# Patient Record
Sex: FEMALE | Race: OTHER | NOT HISPANIC OR LATINO | Employment: STUDENT | URBAN - METROPOLITAN AREA
[De-identification: names, ages, dates, MRNs, and addresses within clinical notes are randomized per-mention and may not be internally consistent; named-entity substitution may affect disease eponyms.]

---

## 2023-09-27 ENCOUNTER — OFFICE VISIT (OUTPATIENT)
Dept: URGENT CARE | Facility: CLINIC | Age: 18
End: 2023-09-27
Payer: COMMERCIAL

## 2023-09-27 VITALS
OXYGEN SATURATION: 98 % | WEIGHT: 78.13 LBS | RESPIRATION RATE: 19 BRPM | SYSTOLIC BLOOD PRESSURE: 112 MMHG | BODY MASS INDEX: 13.84 KG/M2 | HEIGHT: 63 IN | HEART RATE: 80 BPM | DIASTOLIC BLOOD PRESSURE: 84 MMHG | TEMPERATURE: 99 F

## 2023-09-27 DIAGNOSIS — N92.6 IRREGULAR MENSTRUAL CYCLE: Primary | ICD-10-CM

## 2023-09-27 LAB
B-HCG UR QL: NEGATIVE
CTP QC/QA: YES

## 2023-09-27 PROCEDURE — 81025 URINE PREGNANCY TEST: CPT | Mod: S$GLB,,, | Performed by: INTERNAL MEDICINE

## 2023-09-27 PROCEDURE — 99213 PR OFFICE/OUTPT VISIT, EST, LEVL III, 20-29 MIN: ICD-10-PCS | Mod: S$GLB,,, | Performed by: INTERNAL MEDICINE

## 2023-09-27 PROCEDURE — 99213 OFFICE O/P EST LOW 20 MIN: CPT | Mod: S$GLB,,, | Performed by: INTERNAL MEDICINE

## 2023-09-27 PROCEDURE — 81025 POCT URINE PREGNANCY: ICD-10-PCS | Mod: S$GLB,,, | Performed by: INTERNAL MEDICINE

## 2023-09-27 NOTE — PATIENT INSTRUCTIONS
Follow up with GYN if you have not had a cycle in the next week. Pregnancy test negative in clinic.

## 2023-09-27 NOTE — PROGRESS NOTES
"Subjective:      Patient ID: Shiela Smith is a 18 y.o. female.    Vitals:  height is 5' 3" (1.6 m) and weight is 35.5 kg (78 lb 2.5 oz).     Chief Complaint: Menstrual Problem    Patient presents a irregular menstrual.Plan B was taking around 8/2/2023, no birth control.    17 yo patient presents for irregular menstrual cycle. She had unprotected sex on august 1st or second and took a plan B on august 6th. She had a cycle on august 11th and August 19th. She has not had any sexual encounters since then. She has no other symptoms today. She is now one week late for her September cycle.       Genitourinary:  Positive for irregular menstruation.   Skin:  Negative for erythema.      Objective:     Physical Exam   Constitutional: She is oriented to person, place, and time. She appears well-developed. No distress.   HENT:   Head: Normocephalic and atraumatic.   Ears:   Right Ear: External ear normal.   Left Ear: External ear normal.   Nose: Nose normal.   Mouth/Throat: Oropharynx is clear and moist. No oropharyngeal exudate.   Eyes: Conjunctivae and EOM are normal. Pupils are equal, round, and reactive to light. Right eye exhibits no discharge. Left eye exhibits no discharge. No scleral icterus.   Neck: Neck supple.   Cardiovascular: Normal rate, regular rhythm and normal heart sounds.   No murmur heard.Exam reveals no gallop and no friction rub.   Pulmonary/Chest: Effort normal. No respiratory distress. She has no wheezes. She has no rales.   Abdominal: There is no abdominal tenderness.   Lymphadenopathy:     She has no cervical adenopathy.   Neurological: She is alert and oriented to person, place, and time.   Skin: Skin is warm, dry, not diaphoretic and no rash. Capillary refill takes less than 2 seconds. No erythema   Psychiatric: Her behavior is normal.   Nursing note and vitals reviewed.      Assessment:     1. Irregular menstrual cycle        Plan:       Irregular menstrual cycle  -     POCT urine pregnancy  -     " Ambulatory referral/consult to Gynecology    Likely delayed 2/2 to plan B. Two cycles since unprotected sexual encounter. Pregnancy test negative in clinic. Referral to gyn if she has not had cycle in next week.

## 2023-10-31 ENCOUNTER — OFFICE VISIT (OUTPATIENT)
Dept: URGENT CARE | Facility: CLINIC | Age: 18
End: 2023-10-31
Payer: COMMERCIAL

## 2023-10-31 VITALS
RESPIRATION RATE: 18 BRPM | DIASTOLIC BLOOD PRESSURE: 74 MMHG | BODY MASS INDEX: 17.36 KG/M2 | WEIGHT: 98 LBS | OXYGEN SATURATION: 99 % | TEMPERATURE: 98 F | HEART RATE: 78 BPM | HEIGHT: 63 IN | SYSTOLIC BLOOD PRESSURE: 104 MMHG

## 2023-10-31 DIAGNOSIS — R05.9 COUGH, UNSPECIFIED TYPE: Primary | ICD-10-CM

## 2023-10-31 PROCEDURE — 99213 PR OFFICE/OUTPT VISIT, EST, LEVL III, 20-29 MIN: ICD-10-PCS | Mod: S$GLB,,, | Performed by: NURSE PRACTITIONER

## 2023-10-31 PROCEDURE — 99213 OFFICE O/P EST LOW 20 MIN: CPT | Mod: S$GLB,,, | Performed by: NURSE PRACTITIONER

## 2023-10-31 NOTE — PATIENT INSTRUCTIONS
Try cooler showers.  Get in and get out.  No long shower for relaxation.  If your cough continues after taking cooler showers let us know.  Can take Delsym cough syrup during the day to help with cough.  Can continue to take Nyquil at night to help you sleep.

## 2023-10-31 NOTE — PROGRESS NOTES
"Subjective:      Patient ID: Shiela Smith is a 18 y.o. female.    Vitals:  height is 5' 3" (1.6 m) and weight is 44.5 kg (98 lb). Her oral temperature is 98.3 °F (36.8 °C). Her blood pressure is 104/74 and her pulse is 78. Her respiration is 18 and oxygen saturation is 99%.     Chief Complaint: Cough    Patient presents a cough after getting out of the shower that's been on going for 2 months.    Cough  This is a new problem. The current episode started more than 1 month ago. The problem has been unchanged. The problem occurs every few minutes. She has tried OTC cough suppressant (Nyquil) for the symptoms. The treatment provided no relief.     Respiratory:  Positive for cough.     Pt states she only coughs and feels bad with steam showers and the cough last for 3 days  Objective:     Physical Exam   Constitutional: She is oriented to person, place, and time.  Non-toxic appearance. She does not appear ill. No distress.   HENT:   Head: Normocephalic and atraumatic.   Ears:   Right Ear: Tympanic membrane normal.   Left Ear: Tympanic membrane normal.   Nose: No congestion.   Mouth/Throat: Mucous membranes are moist. Oropharynx is clear.   Eyes: Conjunctivae are normal. Pupils are equal, round, and reactive to light. Extraocular movement intact   Cardiovascular: Normal rate, regular rhythm, normal heart sounds and normal pulses.   Pulmonary/Chest: Effort normal and breath sounds normal. No respiratory distress. She has no wheezes.   Slight dry cough noted occasionally         Comments: Slight dry cough noted occasionally    Abdominal: Normal appearance. There is no abdominal tenderness.   Musculoskeletal: Normal range of motion.         General: Normal range of motion.      Right lower leg: No edema.      Left lower leg: No edema.   Neurological: no focal deficit. She is alert and oriented to person, place, and time.   Skin: Skin is warm and not diaphoretic.   Psychiatric: Her behavior is normal. Mood normal.   Nursing " note and vitals reviewed.    Assessment:     1. Cough, unspecified type       Plan:       Patient Instructions   Try cooler showers.  Get in and get out.  No long shower for relaxation.  If your cough continues after taking cooler showers let us know.  Can take Delsym cough syrup during the day to help with cough.  Can continue to take Nyquil at night to help you sleep.

## 2024-04-11 ENCOUNTER — OFFICE VISIT (OUTPATIENT)
Dept: URGENT CARE | Facility: CLINIC | Age: 19
End: 2024-04-11
Payer: COMMERCIAL

## 2024-04-11 VITALS
SYSTOLIC BLOOD PRESSURE: 101 MMHG | WEIGHT: 98.13 LBS | RESPIRATION RATE: 19 BRPM | BODY MASS INDEX: 17.39 KG/M2 | TEMPERATURE: 98 F | OXYGEN SATURATION: 99 % | HEART RATE: 97 BPM | DIASTOLIC BLOOD PRESSURE: 73 MMHG | HEIGHT: 63 IN

## 2024-04-11 DIAGNOSIS — R51.9 ACUTE NONINTRACTABLE HEADACHE, UNSPECIFIED HEADACHE TYPE: Primary | ICD-10-CM

## 2024-04-11 PROCEDURE — 99213 OFFICE O/P EST LOW 20 MIN: CPT | Mod: S$GLB,,, | Performed by: NURSE PRACTITIONER

## 2024-04-11 NOTE — PROGRESS NOTES
"Subjective:      Patient ID: Shiela Smith is a 18 y.o. female.    Vitals:  height is 5' 3" (1.6 m) and weight is 44.5 kg (98 lb 1.7 oz). Her oral temperature is 98.4 °F (36.9 °C). Her blood pressure is 101/73 and her pulse is 97. Her respiration is 19 and oxygen saturation is 99%.     Chief Complaint: Headache    Pt presents an constant headache that started yesterday. Pt states she has been taking tylenol for pain.    19 yo woman, presents to clinic for a headache since yesterday.  She took some Tylenol yesterday, felt a little better and fell asleep.  Reports her headache came back when she tried to go to class.  Describes a headache that is on the back of the head and around both temples.  She woke up a bit sweaty today and with a subjective mild fever. Denies any neck stiffness, n/v/d, recent h/o URI, head trauma, photophobia, LOC, sinus pressure, somnolence, speech problems, weakness in face, arms/legs, loss of sensation/numbness or tingling in face, arms or legs, denies the worst headache of her life. LMP: 3/14/24.  She is not on birth control.  Denies recent sexual activity. She is not prone to having headaches.  Has never seen a PCP or neurology for headache management or diagnosis. Requesting a school note for today due to this headache.     Headache   This is a new problem. The current episode started yesterday. The problem occurs every few minutes. The quality of the pain is described as throbbing. The pain is at a severity of 4/10. The pain is mild. She has tried acetaminophen for the symptoms. The treatment provided mild relief.       Neurological:  Positive for headaches.      Objective:     Physical Exam   Constitutional: She is oriented to person, place, and time. She appears well-developed.  Non-toxic appearance. She does not appear ill. No distress.   HENT:   Head: Normocephalic and atraumatic.   Ears:   Right Ear: Hearing, tympanic membrane, external ear and ear canal normal.   Left Ear: Hearing, " tympanic membrane, external ear and ear canal normal.   Nose: Nose normal. No mucosal edema, rhinorrhea or nasal deformity. No epistaxis. Right sinus exhibits no maxillary sinus tenderness and no frontal sinus tenderness. Left sinus exhibits no maxillary sinus tenderness and no frontal sinus tenderness.   Mouth/Throat: Uvula is midline, oropharynx is clear and moist and mucous membranes are normal. Mucous membranes are moist. No trismus in the jaw. Normal dentition. No uvula swelling. No posterior oropharyngeal erythema.      Comments: No adenopathy  Eyes: Conjunctivae, EOM and lids are normal. Pupils are equal, round, and reactive to light. No scleral icterus. Extraocular movement intact   Neck: Trachea normal and phonation normal. Neck supple. No neck rigidity present.   Cardiovascular: Normal rate, regular rhythm, normal heart sounds and normal pulses.   Pulmonary/Chest: Effort normal and breath sounds normal. No stridor. No respiratory distress. She has no wheezes. She has no rhonchi. She has no rales. She exhibits no tenderness.   Abdominal: Normal appearance and bowel sounds are normal. She exhibits no distension. Soft. There is no abdominal tenderness.   Musculoskeletal: Normal range of motion.         General: No deformity. Normal range of motion.   Neurological: no focal deficit. She is alert and oriented to person, place, and time. She displays no weakness and normal reflexes. No cranial nerve deficit or sensory deficit. She exhibits normal muscle tone. Coordination and gait normal.   Skin: Skin is warm, dry, intact, not diaphoretic and not pale.   Psychiatric: Her speech is normal and behavior is normal. Judgment and thought content normal.   Nursing note and vitals reviewed.      Assessment:     1. Acute nonintractable headache, unspecified headache type        Plan:   Patient with a new onset headache. Declined a Toradol injection today.  She will try conservative management with OTC medications.  This  may be a menstrual headache, which will resolve when menses begin.  Advised to keep a calendar of headaches.  Follow up with PCP for continued headaches. It's also possible, patient is getting sick, advised to monitor symptoms.  If patient continues to have headaches, she will need to be established with the [employer] Access[employer] for school accommodations in the future.  Return to clinic as needed.     Acute nonintractable headache, unspecified headache type

## 2024-04-11 NOTE — LETTER
April 11, 2024      Urgent Care - [facility]  [address] [address]  Phone: [phone]  Fax: [phone]       Patient: Shiela Smith   YOB: 2005  Date of Visit: 04/11/2024    To Whom It May Concern:    Viki Smith  was at [facility] on 04/11/2024. The patient may return to work/school on 04/12/24 with no restrictions. If you have any questions or concerns, or if I can be of further assistance, please do not hesitate to contact me.    Sincerely,    Linda Pina NP

## 2024-04-11 NOTE — PATIENT INSTRUCTIONS
Take Advil 600 mg and Tylenol 1 g one time together with food for your headache.  You can repeat the same regimen every 8 hours.  Hydrate.  Rest.  Follow up with the Office of accessible Education for accommodations and your Primary health provider if you headaches continue.  Go to ER for the worst headache of your life or if it gets worse in intensity or duration.

## 2024-11-15 ENCOUNTER — OFFICE VISIT (OUTPATIENT)
Dept: URGENT CARE | Facility: CLINIC | Age: 19
End: 2024-11-15
Payer: COMMERCIAL

## 2024-11-15 VITALS
WEIGHT: 97.69 LBS | TEMPERATURE: 99 F | HEIGHT: 63 IN | RESPIRATION RATE: 18 BRPM | SYSTOLIC BLOOD PRESSURE: 101 MMHG | BODY MASS INDEX: 17.31 KG/M2 | OXYGEN SATURATION: 97 % | HEART RATE: 118 BPM | DIASTOLIC BLOOD PRESSURE: 72 MMHG

## 2024-11-15 DIAGNOSIS — J06.9 VIRAL URI WITH COUGH: ICD-10-CM

## 2024-11-15 DIAGNOSIS — R05.9 COUGH, UNSPECIFIED TYPE: Primary | ICD-10-CM

## 2024-11-15 LAB
CTP QC/QA: YES
CTP QC/QA: YES
POC MOLECULAR INFLUENZA A AGN: NEGATIVE
POC MOLECULAR INFLUENZA B AGN: NEGATIVE
SARS-COV-2 AG RESP QL IA.RAPID: NEGATIVE

## 2024-11-15 NOTE — PATIENT INSTRUCTIONS
Sore Throat Remedies:  1. Hot tea with honey  2. Salt water gargle  3. Chloraseptic spray    - Ibuprofen 600mg every 6 hours as needed for fever/chills. Do not take for more than 3 days.   - Claritin once daily for 1 week for post nasal drip relief.

## 2024-11-15 NOTE — PROGRESS NOTES
"Subjective:      Patient ID: Shiela Smith is a 19 y.o. female.    Vitals:  height is 5' 3" (1.6 m) and weight is 44.3 kg (97 lb 10.6 oz). Her oral temperature is 99 °F (37.2 °C). Her blood pressure is 101/72 and her pulse is 118 (abnormal). Her respiration is 18 and oxygen saturation is 97%.     Chief Complaint: Cough    This is a 19 y.o. female who presents today with a chief complaint of non-productive cough, sweats/chills at night and sore throat that started 3 days ago. Pt states she's has been taking dayquil without notable relief of her symptoms. Additionally, she has been experiencing some sore/scratchy throat when coughing. Denies ear pain, body aches, congestion.    Cough  This is a new problem. The current episode started in the past 7 days. The problem has been gradually worsening. The problem occurs constantly. Associated symptoms include chills, postnasal drip, a sore throat and sweats. Pertinent negatives include no ear congestion, ear pain, fever, myalgias, nasal congestion, rhinorrhea or shortness of breath. She has tried OTC cough suppressant (dayquil) for the symptoms.       Constitution: Positive for chills and sweating. Negative for fatigue and fever.   HENT:  Positive for postnasal drip and sore throat. Negative for ear pain, sinus pain, sinus pressure and trouble swallowing.    Respiratory:  Positive for cough. Negative for sputum production and shortness of breath.    Musculoskeletal:  Negative for muscle ache.      Objective:     Physical Exam   Constitutional: She is oriented to person, place, and time. She appears well-developed. She is cooperative.  Non-toxic appearance. She does not appear ill. No distress.   HENT:   Head: Normocephalic and atraumatic.   Ears:   Right Ear: Hearing, tympanic membrane, external ear and ear canal normal.   Left Ear: Hearing, tympanic membrane, external ear and ear canal normal.   Nose: Rhinorrhea present. No mucosal edema or nasal deformity. No epistaxis. " Right sinus exhibits no maxillary sinus tenderness and no frontal sinus tenderness. Left sinus exhibits no maxillary sinus tenderness and no frontal sinus tenderness.   Mouth/Throat: Uvula is midline, oropharynx is clear and moist and mucous membranes are normal. No trismus in the jaw. Normal dentition. No uvula swelling. No oropharyngeal exudate, posterior oropharyngeal edema or posterior oropharyngeal erythema.   Eyes: Conjunctivae and lids are normal. No scleral icterus.   Neck: Trachea normal and phonation normal. Neck supple. No edema present. No erythema present. No neck rigidity present.   Cardiovascular: Normal rate, regular rhythm, normal heart sounds and normal pulses.   Pulmonary/Chest: Effort normal and breath sounds normal. No respiratory distress. She has no decreased breath sounds. She has no rhonchi.   Abdominal: Normal appearance.   Musculoskeletal: Normal range of motion.         General: No deformity. Normal range of motion.   Neurological: She is alert and oriented to person, place, and time. She exhibits normal muscle tone. Coordination normal.   Skin: Skin is warm, dry, intact, not diaphoretic and not pale.   Psychiatric: Her speech is normal and behavior is normal. Judgment and thought content normal.   Nursing note and vitals reviewed.      Assessment:     1. Cough, unspecified type    2. Viral URI with cough        Plan:       Cough, unspecified type  -     SARS Coronavirus 2 Antigen, POCT Manual Read - negative  -     POCT Influenza A/B MOLECULAR - negative    Viral URI with cough       -     Counseled regarding treatment of likely upper respiratory viral syndrome with increased fluid intake/hydration, OTC decongestants, mucolytic therapy and analgesics (e.g., ibuprofen, acetaminophen). Encouraged to call/return to clinic if symptoms persist/worsening beyond 48-72 hours approximately.        -    Discussion surrounding elevated HR, and need for increased fluid intake.

## 2024-11-15 NOTE — LETTER
November 15, 2024      Urgent Care - [facility] [place]  [address] [address]  Phone: [phone]  Fax: [phone]       Patient: Shiela Smith   YOB: 2005  Date of Visit: 11/15/2024    To Whom It May Concern:    Viki Smith  was at [facility] on 11/15/2024. The patient may return to work/school on 11/18/2024 with no restrictions. If you have any questions or concerns, or if I can be of further assistance, please do not hesitate to contact me.    Sincerely,    Kim Palma, DNP